# Patient Record
Sex: FEMALE | Race: WHITE | NOT HISPANIC OR LATINO | Employment: OTHER | URBAN - METROPOLITAN AREA
[De-identification: names, ages, dates, MRNs, and addresses within clinical notes are randomized per-mention and may not be internally consistent; named-entity substitution may affect disease eponyms.]

---

## 2017-03-02 RX ORDER — TRAZODONE HYDROCHLORIDE 50 MG/1
TABLET ORAL
Qty: 90 TAB | Refills: 6 | OUTPATIENT
Start: 2017-03-02

## 2017-03-03 NOTE — TELEPHONE ENCOUNTER
Insurance requesting 90 day supply of trazadone and prempro    Was the patient seen in the last year in this department? Yes     Does patient have an active prescription for medications requested? No     Received Request Via: Pharmacy

## 2017-03-31 ENCOUNTER — OFFICE VISIT (OUTPATIENT)
Dept: MEDICAL GROUP | Facility: MEDICAL CENTER | Age: 52
End: 2017-03-31
Attending: FAMILY MEDICINE
Payer: MEDICAID

## 2017-03-31 VITALS
RESPIRATION RATE: 16 BRPM | DIASTOLIC BLOOD PRESSURE: 82 MMHG | HEART RATE: 96 BPM | TEMPERATURE: 98.2 F | OXYGEN SATURATION: 99 % | SYSTOLIC BLOOD PRESSURE: 138 MMHG | HEIGHT: 67 IN

## 2017-03-31 DIAGNOSIS — G25.81 RLS (RESTLESS LEGS SYNDROME): ICD-10-CM

## 2017-03-31 PROCEDURE — 99212 OFFICE O/P EST SF 10 MIN: CPT | Performed by: FAMILY MEDICINE

## 2017-03-31 PROCEDURE — 99213 OFFICE O/P EST LOW 20 MIN: CPT | Performed by: FAMILY MEDICINE

## 2017-03-31 RX ORDER — CLONAZEPAM 1 MG/1
1 TABLET ORAL NIGHTLY PRN
Qty: 30 TAB | Refills: 2 | Status: SHIPPED | OUTPATIENT
Start: 2017-03-31 | End: 2017-06-22 | Stop reason: SDUPTHER
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
## 2017-04-01 NOTE — PROGRESS NOTES
"Subjective:      Astrid Kendall is a 51 y.o. female who presents with Sleep Problem            HPI 1. Restless leg syndrome-patient reports continued good effect taking a single dose of clonazepam 1 mg shortly before bedtime. This allows her to avoid the irritated restless feelings in both legs that otherwise would prolonged sleep latency. She is not having any ankle edema or focal numbness.    ROS negative for altered gait, foot pain, knee pain   Objective:     /82 mmHg  Pulse 96  Temp(Src) 36.8 °C (98.2 °F)  Resp 16  Ht 1.702 m (5' 7.01\")  Wt   SpO2 99%     Physical Exam  Gen.- alert, cooperative, in no acute distress  Neck- midline trachea, thyroid not enlarged or tender,supple, no cervical adenopathy  Chest-clear to auscultation and percussion with normal breath sounds. No retractions. Chest wall nontender  Cardiac- regular rhythm and rate. No murmur, thrill, or heave  Lower extremity-negative for ankle edema, redness, tenderness          Assessment/Plan:     1. RLS (restless legs syndrome)    - clonazepam (KLONOPIN) 1 MG Tab; Take 1 Tab by mouth at bedtime as needed (sleep disturbance).  Dispense: 30 Tab; Refill: 2     Plan: 1. Renew clonazepam 1 mg at at bedtime with 2 refills, #30 each fill  2. Revisit in 3 months  3. UDS reviewed today, 9/29/16-consistent  "

## 2017-04-12 DIAGNOSIS — Z79.890 POSTMENOPAUSAL HRT (HORMONE REPLACEMENT THERAPY): ICD-10-CM

## 2017-04-12 NOTE — TELEPHONE ENCOUNTER
Was the patient seen in the last year in this department? Yes     Does patient have an active prescription for medications requested? No     Received Request Via: Pharmacy. Pt insurance requesting 90 day supply

## 2017-04-13 RX ORDER — TRAZODONE HYDROCHLORIDE 50 MG/1
TABLET ORAL
Qty: 90 TAB | Refills: 6 | Status: SHIPPED | OUTPATIENT
Start: 2017-04-13 | End: 2017-12-27 | Stop reason: SDUPTHER

## 2017-04-17 DIAGNOSIS — Z79.890 POSTMENOPAUSAL HRT (HORMONE REPLACEMENT THERAPY): ICD-10-CM

## 2017-04-18 NOTE — TELEPHONE ENCOUNTER
Pt overdue for PAP (2013), and has not done mammo ordered Dec 2016. Needs well woman visit with me, and complete her mammo next 30d to safely keep using estrogen.

## 2017-04-18 NOTE — TELEPHONE ENCOUNTER
Was the patient seen in the last year in this department? Yes     Does patient have an active prescription for medications requested? Yes     Received Request Via: Pharmacy mus t be 90 day supply

## 2017-05-16 RX ORDER — ESTROGEN,CON/M-PROGEST ACET 0.45-1.5MG
TABLET ORAL
Qty: 28 TAB | Refills: 1 | Status: SHIPPED | OUTPATIENT
Start: 2017-05-16 | End: 2018-06-12

## 2017-06-16 ENCOUNTER — TELEPHONE (OUTPATIENT)
Dept: MEDICAL GROUP | Facility: MEDICAL CENTER | Age: 52
End: 2017-06-16

## 2017-06-17 NOTE — TELEPHONE ENCOUNTER
----- Message from William Pelayo M.D. sent at 6/15/2017  8:59 AM PDT -----  Mammogram results are normal. Please repeat exam in 1 year.

## 2017-06-22 ENCOUNTER — OFFICE VISIT (OUTPATIENT)
Dept: MEDICAL GROUP | Facility: MEDICAL CENTER | Age: 52
End: 2017-06-22
Attending: FAMILY MEDICINE
Payer: MEDICAID

## 2017-06-22 VITALS
HEART RATE: 92 BPM | HEIGHT: 67 IN | WEIGHT: 206 LBS | RESPIRATION RATE: 16 BRPM | SYSTOLIC BLOOD PRESSURE: 126 MMHG | OXYGEN SATURATION: 96 % | DIASTOLIC BLOOD PRESSURE: 78 MMHG | BODY MASS INDEX: 32.33 KG/M2 | TEMPERATURE: 97.9 F

## 2017-06-22 DIAGNOSIS — Z12.11 SCREENING FOR MALIGNANT NEOPLASM OF COLON: ICD-10-CM

## 2017-06-22 DIAGNOSIS — Z78.0 MENOPAUSE: ICD-10-CM

## 2017-06-22 DIAGNOSIS — G25.81 RLS (RESTLESS LEGS SYNDROME): ICD-10-CM

## 2017-06-22 DIAGNOSIS — E66.9 OBESITY (BMI 30-39.9): ICD-10-CM

## 2017-06-22 PROCEDURE — 99212 OFFICE O/P EST SF 10 MIN: CPT | Performed by: FAMILY MEDICINE

## 2017-06-22 PROCEDURE — 99213 OFFICE O/P EST LOW 20 MIN: CPT | Performed by: FAMILY MEDICINE

## 2017-06-22 RX ORDER — CLONAZEPAM 1 MG/1
1 TABLET ORAL NIGHTLY PRN
Qty: 30 TAB | Refills: 2 | Status: SHIPPED | OUTPATIENT
Start: 2017-06-22 | End: 2017-11-07 | Stop reason: SDUPTHER

## 2017-06-22 RX ORDER — PRAMIPEXOLE DIHYDROCHLORIDE 0.5 MG/1
0.5 TABLET ORAL
Qty: 90 TAB | Refills: 3 | Status: SHIPPED | OUTPATIENT
Start: 2017-06-22 | End: 2018-05-04 | Stop reason: SDUPTHER
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
## 2017-06-22 NOTE — PROGRESS NOTES
"Subjective:      Astrid Kednall is a 52 y.o. female who presents with Follow-Up            HPI 1. Menopause-patient reports significant improvement in recent hot flash symptoms since taking Prempro at a reduced dose or 0.4/2.5 daily. Recent mammogram was unremarkable. Review the chart shows that last Pap smear probably was completed with [facility] in 2013 and patient is due.  2. Restless leg syndrome-patient reports that she sleeps better taking the pramipexole at bedtime and then typically waking up in the middle of the night at which time she takes the Klonopin. She reports if she does not take the pramipexole then there is no hope for any significant sleep overnight. Not reporting any leg numbness or tremor.    ROS negative for altered gait. Positive for one single episode of light vaginal spotting less than 2 days, about 2 weeks ago. Negative for dysuria, hematuria, bloody stools       Objective:     /78 mmHg  Pulse 92  Temp(Src) 36.6 °C (97.9 °F)  Resp 16  Ht 1.702 m (5' 7.01\")  Wt 93.441 kg (206 lb)  BMI 32.26 kg/m2  SpO2 96%  Breastfeeding? No     Physical Exam  Gen.- alert, cooperative, in no acute distress  Neck- midline trachea, thyroid not enlarged or tender,supple, no cervical adenopathy  Chest-clear to auscultation and percussion with normal breath sounds. No retractions. Chest wall nontender  Cardiac- regular rhythm and rate. No murmur, thrill, or heave  Lower extremities-intact light touch. Intact strength. Negative for tremor or edema          Assessment/Plan:     1. Menopause      2. RLS (restless legs syndrome)    Plan: 1. Will schedule Pap smear in 3 months at our upcoming next visit  2. Continue Klonopin  3. Again reviewed the risks associated with long-term estrogen/progesterone replacement-will decrease Premarin to 0.3/1.5 once daily  4. FIT packet dispensed today  5. Patient understands to call immediately if she has any further spotting which would require evaluation of " her endometrial lining.

## 2017-11-07 ENCOUNTER — OFFICE VISIT (OUTPATIENT)
Dept: MEDICAL GROUP | Facility: MEDICAL CENTER | Age: 52
End: 2017-11-07
Attending: FAMILY MEDICINE
Payer: MEDICAID

## 2017-11-07 VITALS
OXYGEN SATURATION: 95 % | HEART RATE: 80 BPM | SYSTOLIC BLOOD PRESSURE: 130 MMHG | DIASTOLIC BLOOD PRESSURE: 82 MMHG | RESPIRATION RATE: 16 BRPM | HEIGHT: 67 IN | TEMPERATURE: 98.5 F

## 2017-11-07 DIAGNOSIS — Z23 FLU VACCINE NEED: ICD-10-CM

## 2017-11-07 DIAGNOSIS — L98.9 SKIN LESION OF FACE: ICD-10-CM

## 2017-11-07 DIAGNOSIS — G25.81 RLS (RESTLESS LEGS SYNDROME): ICD-10-CM

## 2017-11-07 PROCEDURE — 99213 OFFICE O/P EST LOW 20 MIN: CPT | Mod: 25 | Performed by: FAMILY MEDICINE

## 2017-11-07 RX ORDER — CLONAZEPAM 1 MG/1
1 TABLET ORAL NIGHTLY PRN
Qty: 30 TAB | Refills: 2 | Status: SHIPPED | OUTPATIENT
Start: 2017-11-07 | End: 2018-02-12 | Stop reason: SDUPTHER

## 2017-11-07 ASSESSMENT — PAIN SCALES - GENERAL: PAINLEVEL: 6=MODERATE PAIN

## 2017-11-07 NOTE — PROGRESS NOTES
"Subjective:      Astrid Kendall is a 52 y.o. female who presents with No chief complaint on file.            HPIHPI:1. Facial skin lesion-patient noticed a slightly roughened area on the lateral right cheek approximately 2 weeks ago. This area has not been tender there's been no bleeding or discharge.  2. Restless leg syndrome-patient reports that she is getting much better control and is able to go to sleep at bedtime without having the severe disturbing sensations in her legs as she rolls her legs into bed, as long as she is taking the pramipexole. She often times will awaken later in the night and is still taking Klonopin 1 mg 1 dose nightly.Not reporting any significant leg swelling.    ROS negative for recent vaginal bleeding, fevers, focal numbness       Objective:     /82   Pulse 80   Temp 36.9 °C (98.5 °F)   Resp 16   Ht 1.702 m (5' 7\")   SpO2 95%      Physical Exam  Gen.-alert cooperative female in no acute distress  Skin-10 x 7 mm slightly roughened tan papule in the right upper lateral cheek area. Sharp margins, suggestive of a seborrheic keratosis  Lower extremities-intact light touch. Intact strength. Negative for ankle edema           Assessment/Plan:     1. Flu vaccine need    - INFLUENZA VACCINE QUAD INJ >3Y(PF)    2. RLS (restless legs syndrome)    - clonazepam (KLONOPIN) 1 MG Tab; Take 1 Tab by mouth at bedtime as needed (sleep disturbance).  Dispense: 30 Tab; Refill: 2    3. Skin lesion of face -Clinically suspect seborrheic keratosis    Plan: 1. Flu vaccine today  2. Patient will consider whether she would like to have a punch biopsy taken of her skin lesion in anticipation of either shave excision or freezing  3. Renew Klonopin today  4. Will schedule well woman visit in 3 months    "

## 2017-12-28 RX ORDER — TRAZODONE HYDROCHLORIDE 50 MG/1
TABLET ORAL
Qty: 30 TAB | Refills: 4 | Status: SHIPPED | OUTPATIENT
Start: 2017-12-28 | End: 2018-05-04 | Stop reason: SDUPTHER

## 2018-02-12 ENCOUNTER — HOSPITAL ENCOUNTER (OUTPATIENT)
Facility: MEDICAL CENTER | Age: 53
End: 2018-02-12
Attending: FAMILY MEDICINE
Payer: MEDICAID

## 2018-02-12 ENCOUNTER — OFFICE VISIT (OUTPATIENT)
Dept: MEDICAL GROUP | Facility: MEDICAL CENTER | Age: 53
End: 2018-02-12
Attending: FAMILY MEDICINE
Payer: MEDICAID

## 2018-02-12 VITALS
HEART RATE: 94 BPM | TEMPERATURE: 97 F | WEIGHT: 213.4 LBS | SYSTOLIC BLOOD PRESSURE: 122 MMHG | RESPIRATION RATE: 16 BRPM | OXYGEN SATURATION: 93 % | HEIGHT: 67 IN | BODY MASS INDEX: 33.49 KG/M2 | DIASTOLIC BLOOD PRESSURE: 82 MMHG

## 2018-02-12 DIAGNOSIS — Z12.4 SCREENING FOR MALIGNANT NEOPLASM OF CERVIX: ICD-10-CM

## 2018-02-12 DIAGNOSIS — Z01.419 WELL WOMAN EXAM: ICD-10-CM

## 2018-02-12 DIAGNOSIS — E66.9 OBESITY (BMI 30-39.9): ICD-10-CM

## 2018-02-12 DIAGNOSIS — Z12.11 SCREENING FOR MALIGNANT NEOPLASM OF COLON: ICD-10-CM

## 2018-02-12 DIAGNOSIS — G25.81 RLS (RESTLESS LEGS SYNDROME): ICD-10-CM

## 2018-02-12 LAB
AMBIGUOUS DTTM AMBI4: NORMAL
CYTOLOGY REG CYTOL: NORMAL

## 2018-02-12 PROCEDURE — 88175 CYTOPATH C/V AUTO FLUID REDO: CPT

## 2018-02-12 PROCEDURE — G0101 CA SCREEN;PELVIC/BREAST EXAM: HCPCS | Performed by: FAMILY MEDICINE

## 2018-02-12 PROCEDURE — 99213 OFFICE O/P EST LOW 20 MIN: CPT | Performed by: FAMILY MEDICINE

## 2018-02-12 RX ORDER — CLONAZEPAM 1 MG/1
1 TABLET ORAL NIGHTLY PRN
Qty: 30 TAB | Refills: 2 | Status: SHIPPED | OUTPATIENT
Start: 2018-02-12 | End: 2018-05-04 | Stop reason: SDUPTHER

## 2018-02-12 ASSESSMENT — PATIENT HEALTH QUESTIONNAIRE - PHQ9: CLINICAL INTERPRETATION OF PHQ2 SCORE: 0

## 2018-02-13 NOTE — PROGRESS NOTES
Chief Complaint   Patient presents with   • Gynecologic Exam       HPI: 1. Cervical cancer screening-patient presents for updated Pap. Last one was possibly done at [place] in the past 2 years. Patient recalls a single abnormal Pap in her early 20s number of studies since then have been normal. Not recalling any vaginal bleeding, pelvic pain  2. Anxiety-patient porches doing well taking half of a 1 mg tablet twice daily, second dose being in the middle of the night when she is often awakened with restless legs syndrome symptoms with quick response to 0.5 mg of clonazepam.   3. Obesity-patient reports she actually has been on a strict diet with modest weight loss in the past 2 weeks. She denies  Hair loss or cold intolerance  Social History     Social History   • Marital status:      Spouse name: N/A   • Number of children: N/A   • Years of education: N/A     Occupational History   • Not on file.     Social History Main Topics   • Smoking status: Never Smoker   • Smokeless tobacco: Never Used   • Alcohol use 0.0 oz/week      Comment: occas   • Drug use: No   • Sexual activity: Not on file      Comment: single, [employer] systems     Other Topics Concern   • Not on file     Social History Narrative   • No narrative on file       No results for input(s): HEMOGLOBIN, HEMATOCRIT, PLATELETCT, SODIUM, POTASSIUM, CHLORIDE, GLUCOSE, BUN, CREATININE in the last 72 hours.    ROS:GEN-no fever, weight loss SKIN-no rash, pruritus HENT-no ear pain, sore throat EYE-no double vision, eye discharge CV-negative for orthopnea, chest pain RESP-negative for cough, wheezing GI-negative for melena, constipation, nausea -negative for dysuria, hematuria NEURO-negative for dizziness, tremor END/HEM-negative for bruisability, bleeding, polydipsia    Current Outpatient Prescriptions on File Prior to Visit   Medication Sig Dispense Refill   • trazodone (DESYREL) 50 MG Tab TAKE ONE TABLET BY MOUTH ONE TIME DAILY IN THE P.M. 30 Tab 4  "  • estrogen, conjugated,-medroxyprogesterone (PREMPRO) 0.3-1.5 MG per tablet Take 1 Tab by mouth every day. 30 Tab 3   • pramipexole (MIRAPEX) 0.5 MG Tab Take 1 Tab by mouth every day. 90 Tab 3   • PREMPRO 0.45-1.5 MG per tablet TAKE ONE TABLET BY MOUTH ONE TIME DAILY 28 Tab 1   • cyclobenzaprine (FLEXERIL) 5 MG tablet TAKE 1-2 TABLETS ORALLY 3 TIMES DAILY AS NEEDED FOR MILD PAIN OR MUSCLE SPASMS - GENERIC FOR: FLEXERIL 90 Tab 0   • ibuprofen (MOTRIN) 600 MG Tab Take 1 Tab by mouth every 6 hours as needed for Mild Pain. 90 Tab 3   • meloxicam (MOBIC) 7.5 MG Tab Take 1 Tab by mouth every day. 30 Tab 3     No current facility-administered medications on file prior to visit.          Physical Exam:Blood pressure 122/82, pulse 94, temperature 36.1 °C (97 °F), resp. rate 16, height 1.702 m (5' 7\"), weight 96.8 kg (213 lb 6.4 oz), SpO2 93 %.  \"Gen.- alert, cooperative, in no acute distress  Neck- midline trachea, thyroid not enlarged or tender,supple, no cervical adenopathy  Chest-clear to auscultation and percussion with normal breath sounds. No retractions. Chest wall nontender  Cardiac- regular rhythm and rate. No murmur, thrill, or heave  Abdomen-Abdomen is soft, nontender, normal bowel sounds, no masses, guarding, or organomegaly.  Skin-Skin is clear without rash, redness, or cyanosis.  HEENT-Skull is A/N. TMs and canals are clear. Oropharynx shows intact lips and dentition, tongue is midline, mucosa is pink and moist.   Eyes- show flat discs and normal vessels, clear conjunctiva and sclera, EOMI, PERRL. Lids and and lashes are clear  -normal female external genitalia. Vagina shows pink moist mucosa without purulent discharge. Cervix is nontender. Adnexa are nontender and not enlarged on palpation. Uterus is midline and nontender by palpation.  Neuro- intact light touch. Intact strength bilaterally. Normal gait. No tremor. Normal speech     Assessment:  1. RLS (restless legs syndrome)  clonazePAM (KLONOPIN) 1 MG " Tab   2. Obesity (BMI 30-39.9)  Patient identified as having weight management issue.  Appropriate orders and counseling given.   3. Well woman exam     4. Screening for malignant neoplasm of cervix  PAP IG, RFX HPV ASCU   5. Screening for malignant neoplasm of colon  OCCULT BLOOD FECES IMMUNOASSAY       Plan: 1. Renew clonazepam, 2 refills  2. Revisit in 3 months  3. Continue current efforts to limit calories and pursue gradual weight loss  4. Check on pending Pap  5. FIT packet provided

## 2018-02-15 ENCOUNTER — TELEPHONE (OUTPATIENT)
Dept: MEDICAL GROUP | Facility: MEDICAL CENTER | Age: 53
End: 2018-02-15

## 2018-02-15 NOTE — TELEPHONE ENCOUNTER
----- Message from William Pelayo M.D. sent at 2/15/2018  7:39 AM PST -----  Pap smear is negative for cancer cells

## 2018-03-05 ENCOUNTER — OFFICE VISIT (OUTPATIENT)
Dept: MEDICAL GROUP | Facility: MEDICAL CENTER | Age: 53
End: 2018-03-05
Attending: FAMILY MEDICINE
Payer: MEDICAID

## 2018-03-05 VITALS
SYSTOLIC BLOOD PRESSURE: 124 MMHG | DIASTOLIC BLOOD PRESSURE: 80 MMHG | BODY MASS INDEX: 33.43 KG/M2 | HEIGHT: 67 IN | OXYGEN SATURATION: 95 % | RESPIRATION RATE: 16 BRPM | HEART RATE: 100 BPM | WEIGHT: 213 LBS | TEMPERATURE: 98.4 F

## 2018-03-05 DIAGNOSIS — J01.00 ACUTE MAXILLARY SINUSITIS, RECURRENCE NOT SPECIFIED: ICD-10-CM

## 2018-03-05 DIAGNOSIS — R05.9 COUGH: ICD-10-CM

## 2018-03-05 PROCEDURE — 99212 OFFICE O/P EST SF 10 MIN: CPT | Performed by: FAMILY MEDICINE

## 2018-03-05 PROCEDURE — 99213 OFFICE O/P EST LOW 20 MIN: CPT | Performed by: FAMILY MEDICINE

## 2018-03-05 RX ORDER — AZITHROMYCIN 250 MG/1
TABLET, FILM COATED ORAL
Qty: 6 TAB | Refills: 1 | Status: SHIPPED | OUTPATIENT
Start: 2018-03-05 | End: 2018-06-12

## 2018-03-05 RX ORDER — BENZONATATE 200 MG/1
200 CAPSULE ORAL 3 TIMES DAILY PRN
Qty: 30 CAP | Refills: 0 | Status: SHIPPED | OUTPATIENT
Start: 2018-03-05 | End: 2018-03-25

## 2018-03-05 ASSESSMENT — PAIN SCALES - GENERAL: PAINLEVEL: NO PAIN

## 2018-03-05 NOTE — PROGRESS NOTES
"Subjective:      Astrid Kendall is a 52 y.o. female who presents with Sinusitis and Cough            HPI 1. Cough/sinusitis-patient reports that she's had her current persistent cough over the past 6 weeks although it has worsened in the past 10 days. She does report that yesterday was improved but then the cough seems to have relapsed today. It is productive of some mucus. She reports the recurrent coughing episodes tend to happen at night after she is laying down. 6 weeks ago she had onset of chills lasting 1-2 days before subsiding. She does not smoke    ROS notable for early or hemoptysis 1-2 months ago, no recent fever or recent emesis in the past 1 month       Objective:     /80   Pulse 100   Temp 36.9 °C (98.4 °F)   Resp 16   Ht 1.702 m (5' 7.01\")   Wt 96.6 kg (213 lb)   SpO2 95%   BMI 33.35 kg/m²      Physical Exam  General- alert,cooperative patient in no acute distress  Ears- normal tms without redness, perforation. Canals unremarkable  Nares- clear, pink, moist mucosa without bleeding. No purulent nasal DC  Orophx.- lips normal. Clear, pink, moist mucosa without redness or exudate. Tongue is midline  Chest-Normal to auscultation and percussion, movement is symmetric. Nontender to palpation.  Sinuses-1+ tender over the maxillary areas bilaterally, frontal areas nontender            Assessment/Plan:     1. Acute maxillary sinusitis, recurrence not specified      2. Cough    Plan: 1. Zithromax Z-JANI, 1 refill  2. Tessalon Perles 200 mg up to 3 times a day as needed for cough  3. Follow-up as needed      "

## 2018-05-04 DIAGNOSIS — G25.81 RLS (RESTLESS LEGS SYNDROME): ICD-10-CM

## 2018-05-04 RX ORDER — PRAMIPEXOLE DIHYDROCHLORIDE 0.5 MG/1
0.5 TABLET ORAL
Qty: 90 TAB | Refills: 3 | Status: SHIPPED | OUTPATIENT
Start: 2018-05-04 | End: 2018-06-12 | Stop reason: SDUPTHER

## 2018-05-04 RX ORDER — CLONAZEPAM 1 MG/1
1 TABLET ORAL NIGHTLY PRN
Qty: 30 TAB | Refills: 2 | Status: SHIPPED | OUTPATIENT
Start: 2018-05-04 | End: 2018-06-03

## 2018-05-04 RX ORDER — TRAZODONE HYDROCHLORIDE 50 MG/1
TABLET ORAL
Qty: 30 TAB | Refills: 4 | Status: SHIPPED | OUTPATIENT
Start: 2018-05-04 | End: 2018-06-12 | Stop reason: SDUPTHER

## 2018-06-12 ENCOUNTER — OFFICE VISIT (OUTPATIENT)
Dept: MEDICAL GROUP | Facility: PHYSICIAN GROUP | Age: 53
End: 2018-06-12
Payer: COMMERCIAL

## 2018-06-12 VITALS
SYSTOLIC BLOOD PRESSURE: 132 MMHG | OXYGEN SATURATION: 94 % | DIASTOLIC BLOOD PRESSURE: 90 MMHG | RESPIRATION RATE: 18 BRPM | TEMPERATURE: 98.9 F | BODY MASS INDEX: 32.69 KG/M2 | WEIGHT: 208.3 LBS | HEART RATE: 85 BPM | HEIGHT: 67 IN

## 2018-06-12 DIAGNOSIS — F51.01 PRIMARY INSOMNIA: ICD-10-CM

## 2018-06-12 DIAGNOSIS — Z12.11 SCREENING FOR COLON CANCER: ICD-10-CM

## 2018-06-12 DIAGNOSIS — G25.81 RLS (RESTLESS LEGS SYNDROME): ICD-10-CM

## 2018-06-12 DIAGNOSIS — J31.0 CHRONIC RHINITIS: ICD-10-CM

## 2018-06-12 PROCEDURE — 99213 OFFICE O/P EST LOW 20 MIN: CPT | Performed by: FAMILY MEDICINE

## 2018-06-12 RX ORDER — CLONAZEPAM 1 MG/1
1 TABLET ORAL NIGHTLY PRN
COMMUNITY
End: 2018-06-20 | Stop reason: SDUPTHER

## 2018-06-12 RX ORDER — FLUTICASONE PROPIONATE 50 MCG
1 SPRAY, SUSPENSION (ML) NASAL DAILY
COMMUNITY
End: 2018-06-12 | Stop reason: SDUPTHER

## 2018-06-12 RX ORDER — PRAMIPEXOLE DIHYDROCHLORIDE 0.5 MG/1
0.5 TABLET ORAL
Qty: 90 TAB | Refills: 1 | Status: SHIPPED | OUTPATIENT
Start: 2018-06-12 | End: 2018-11-26 | Stop reason: SDUPTHER

## 2018-06-12 RX ORDER — FLUTICASONE PROPIONATE 50 MCG
1 SPRAY, SUSPENSION (ML) NASAL DAILY
Qty: 16 G | Refills: 5 | Status: SHIPPED | OUTPATIENT
Start: 2018-06-12 | End: 2018-11-26 | Stop reason: SDUPTHER

## 2018-06-12 RX ORDER — TRAZODONE HYDROCHLORIDE 50 MG/1
TABLET ORAL
Qty: 30 TAB | Refills: 5 | Status: SHIPPED | OUTPATIENT
Start: 2018-06-12 | End: 2018-11-26 | Stop reason: SDUPTHER
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
## 2018-06-12 NOTE — ASSESSMENT & PLAN NOTE
Has been taking pramipexole nightly for years, with occ. Clonazepam if not sufficient relief. Needs refill pramipexole today, has enough clonazepam for the moment.

## 2018-06-20 ENCOUNTER — TELEPHONE (OUTPATIENT)
Dept: MEDICAL GROUP | Facility: PHYSICIAN GROUP | Age: 53
End: 2018-06-20

## 2018-06-20 DIAGNOSIS — G25.81 RESTLESS LEGS SYNDROME (RLS): ICD-10-CM

## 2018-06-20 RX ORDER — CLONAZEPAM 1 MG/1
1 TABLET ORAL NIGHTLY PRN
Qty: 30 TAB | Refills: 0 | Status: SHIPPED | OUTPATIENT
Start: 2018-06-20 | End: 2018-07-17 | Stop reason: SDUPTHER

## 2018-06-20 NOTE — TELEPHONE ENCOUNTER
Called patient back and let her know that her refill for Clonazepam was approved and sent to pharmacy.

## 2018-06-20 NOTE — TELEPHONE ENCOUNTER
Pt called and stated this medication wasn't refilled at the time of the new patient appt. She is requesting this get refilled.

## 2018-06-21 NOTE — TELEPHONE ENCOUNTER
This was refilled by the provider in [place]. The office in [place] faxed the medication to the pharmacy, in the [place] office we received an notice from the pharmacy that it was faxed incorrectly. I spoke to Angela and advised her of this.    The pt called today and stated her pharmacy never received this. I spoke to Fay MONROE, and they couldn't find the script so I called the pharmacy and called in this medication. Pt was notified by Subhash.

## 2018-07-17 DIAGNOSIS — G25.81 RLS (RESTLESS LEGS SYNDROME): ICD-10-CM

## 2018-07-17 DIAGNOSIS — G25.81 RESTLESS LEGS SYNDROME (RLS): ICD-10-CM

## 2018-07-17 RX ORDER — CLONAZEPAM 1 MG/1
1 TABLET ORAL NIGHTLY PRN
Qty: 30 TAB | Refills: 0 | Status: SHIPPED | OUTPATIENT
Start: 2018-09-19 | End: 2018-09-21 | Stop reason: SDUPTHER

## 2018-07-17 RX ORDER — CLONAZEPAM 1 MG/1
1 TABLET ORAL NIGHTLY PRN
Qty: 30 TAB | Refills: 0 | Status: SHIPPED | OUTPATIENT
Start: 2018-08-19 | End: 2018-07-17 | Stop reason: SDUPTHER

## 2018-09-20 DIAGNOSIS — G25.81 RESTLESS LEGS SYNDROME (RLS): ICD-10-CM

## 2018-09-20 RX ORDER — CLONAZEPAM 1 MG/1
1 TABLET ORAL NIGHTLY PRN
Qty: 30 TAB | Refills: 0 | OUTPATIENT
Start: 2018-09-20 | End: 2018-10-20

## 2018-09-20 NOTE — TELEPHONE ENCOUNTER
Patient has no pills left and she really needs this medication. She is asking for a small refill to last her until her appointment on 9/25/18?

## 2018-09-21 DIAGNOSIS — G25.81 RESTLESS LEGS SYNDROME (RLS): ICD-10-CM

## 2018-09-21 RX ORDER — CLONAZEPAM 1 MG/1
1 TABLET ORAL NIGHTLY PRN
Qty: 10 TAB | Refills: 0 | Status: SHIPPED | OUTPATIENT
Start: 2018-09-21 | End: 2018-09-25

## 2018-09-25 ENCOUNTER — OFFICE VISIT (OUTPATIENT)
Dept: MEDICAL GROUP | Facility: PHYSICIAN GROUP | Age: 53
End: 2018-09-25
Payer: COMMERCIAL

## 2018-09-25 VITALS
WEIGHT: 208 LBS | SYSTOLIC BLOOD PRESSURE: 130 MMHG | HEIGHT: 67 IN | HEART RATE: 79 BPM | RESPIRATION RATE: 14 BRPM | DIASTOLIC BLOOD PRESSURE: 82 MMHG | BODY MASS INDEX: 32.65 KG/M2 | OXYGEN SATURATION: 97 % | TEMPERATURE: 97.9 F

## 2018-09-25 DIAGNOSIS — Z23 NEED FOR VACCINATION: ICD-10-CM

## 2018-09-25 DIAGNOSIS — G25.81 RESTLESS LEGS SYNDROME (RLS): ICD-10-CM

## 2018-09-25 PROCEDURE — 90686 IIV4 VACC NO PRSV 0.5 ML IM: CPT | Performed by: FAMILY MEDICINE

## 2018-09-25 PROCEDURE — 90471 IMMUNIZATION ADMIN: CPT | Performed by: FAMILY MEDICINE

## 2018-09-25 PROCEDURE — 99212 OFFICE O/P EST SF 10 MIN: CPT | Mod: 25 | Performed by: FAMILY MEDICINE

## 2018-09-25 RX ORDER — CLONAZEPAM 1 MG/1
1 TABLET ORAL NIGHTLY PRN
Qty: 30 TAB | Refills: 0 | Status: SHIPPED | OUTPATIENT
Start: 2018-10-20 | End: 2018-09-25 | Stop reason: SDUPTHER

## 2018-09-25 RX ORDER — CLONAZEPAM 1 MG/1
1 TABLET ORAL NIGHTLY PRN
Qty: 30 TAB | Refills: 0 | Status: SHIPPED | OUTPATIENT
Start: 2018-11-20 | End: 2018-12-20
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
## 2018-09-26 NOTE — ASSESSMENT & PLAN NOTE
Doing well on current dose of Klonopin. Apparently pharmacy filled latest Rx for #30 instead of the #10 ordered.

## 2018-11-26 DIAGNOSIS — J31.0 CHRONIC RHINITIS: ICD-10-CM

## 2018-11-26 DIAGNOSIS — G25.81 RLS (RESTLESS LEGS SYNDROME): ICD-10-CM

## 2018-11-26 DIAGNOSIS — F51.01 PRIMARY INSOMNIA: ICD-10-CM

## 2018-11-26 DIAGNOSIS — M62.838 MUSCLE SPASM: ICD-10-CM

## 2018-11-26 RX ORDER — IBUPROFEN 600 MG/1
TABLET ORAL
Qty: 90 TAB | Refills: 0 | Status: SHIPPED | OUTPATIENT
Start: 2018-11-26 | End: 2019-12-10 | Stop reason: SDUPTHER

## 2018-11-26 RX ORDER — PRAMIPEXOLE DIHYDROCHLORIDE 0.5 MG/1
0.5 TABLET ORAL
Qty: 90 TAB | Refills: 1 | Status: SHIPPED | OUTPATIENT
Start: 2018-11-26 | End: 2019-01-09 | Stop reason: SDUPTHER

## 2018-11-26 RX ORDER — CYCLOBENZAPRINE HCL 5 MG
TABLET ORAL
Qty: 90 TAB | Refills: 0 | Status: SHIPPED | OUTPATIENT
Start: 2018-11-26

## 2018-11-26 RX ORDER — TRAZODONE HYDROCHLORIDE 50 MG/1
TABLET ORAL
Qty: 30 TAB | Refills: 5 | Status: SHIPPED | OUTPATIENT
Start: 2018-11-26 | End: 2018-12-08 | Stop reason: SDUPTHER

## 2018-11-26 RX ORDER — FLUTICASONE PROPIONATE 50 MCG
1 SPRAY, SUSPENSION (ML) NASAL DAILY
Qty: 16 G | Refills: 5 | Status: SHIPPED | OUTPATIENT
Start: 2018-11-26 | End: 2019-04-02 | Stop reason: SDUPTHER

## 2018-11-26 NOTE — TELEPHONE ENCOUNTER
Patient called stating she has been trying to get all of her medications sent to [facility] [id number] in [place] and they haven't received anything yet.     Was the patient seen in the last year in this department? Yes    Does patient have an active prescription for medications requested? Yes    Received Request Via: Patient    Last Visit: 9/25/18  Last Lab: 12/19/15

## 2019-01-03 ENCOUNTER — OFFICE VISIT (OUTPATIENT)
Dept: MEDICAL GROUP | Facility: PHYSICIAN GROUP | Age: 54
End: 2019-01-03
Payer: COMMERCIAL

## 2019-01-03 VITALS
HEIGHT: 67 IN | HEART RATE: 92 BPM | BODY MASS INDEX: 29.03 KG/M2 | RESPIRATION RATE: 16 BRPM | WEIGHT: 185 LBS | DIASTOLIC BLOOD PRESSURE: 96 MMHG | TEMPERATURE: 98.4 F | SYSTOLIC BLOOD PRESSURE: 148 MMHG | OXYGEN SATURATION: 95 %

## 2019-01-03 DIAGNOSIS — Z12.11 SCREENING FOR COLON CANCER: ICD-10-CM

## 2019-01-03 DIAGNOSIS — G25.81 RLS (RESTLESS LEGS SYNDROME): ICD-10-CM

## 2019-01-03 PROCEDURE — 99212 OFFICE O/P EST SF 10 MIN: CPT | Performed by: FAMILY MEDICINE

## 2019-01-03 RX ORDER — CLONAZEPAM 1 MG/1
1 TABLET ORAL
Qty: 30 TAB | Refills: 0 | Status: SHIPPED | OUTPATIENT
Start: 2019-03-03 | End: 2019-01-03 | Stop reason: SDUPTHER

## 2019-01-03 RX ORDER — CLONAZEPAM 1 MG/1
1 TABLET ORAL
Qty: 30 TAB | Refills: 0 | Status: SHIPPED | OUTPATIENT
Start: 2019-01-03 | End: 2019-01-03 | Stop reason: SDUPTHER

## 2019-01-03 RX ORDER — CLONAZEPAM 1 MG/1
1 TABLET ORAL
Qty: 30 TAB | Refills: 0 | Status: SHIPPED | OUTPATIENT
Start: 2019-02-03 | End: 2019-01-03 | Stop reason: SDUPTHER

## 2019-01-03 RX ORDER — CLONAZEPAM 1 MG/1
1 TABLET ORAL
Qty: 30 TAB | Refills: 0 | Status: SHIPPED | OUTPATIENT
Start: 2019-01-03 | End: 2019-02-03

## 2019-01-03 ASSESSMENT — PATIENT HEALTH QUESTIONNAIRE - PHQ9: CLINICAL INTERPRETATION OF PHQ2 SCORE: 0
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
## 2019-02-05 ENCOUNTER — TELEPHONE (OUTPATIENT)
Dept: MEDICAL GROUP | Facility: PHYSICIAN GROUP | Age: 54
End: 2019-02-05

## 2019-02-05 NOTE — TELEPHONE ENCOUNTER
Patient called and left 2 vm on 02/04/19. This was not f.v up.    The notes stated something regarding her Clonazepam, unsure of message. Called pt, left vm and advised to call back regarding this.

## 2019-02-05 NOTE — TELEPHONE ENCOUNTER
Phone Number Called: Astrid Kendall      Message: patient was called back and another encounter was open regarding this.     Left Message for patient to call back: N\A

## 2019-02-05 NOTE — TELEPHONE ENCOUNTER
Patient called and stated that she was having a hard time getting her clonazepam refilled at her pharmacy at [place]. She stated that that was the only pharmacy that would refill her script until she recently found one her in [place]. She was asking if we could cancel the ones at [place] and call it in at the [facility] pharmacy. Since its a controlled substance medication we could not cancle and re order without a hard paper script. I spoke to  and he stated that he anastacio have to see her for us to refill these meds and giver her a paper scrit or she can wait until dr davis is back on 2/12. Patient stated that this is fucking bullshit and ridiculous and this has been an issue ever since she started seeing the provider.  I said I was sorry and relayed the options again. Patients didn't want to come in or wait until 2/12 and stated that she would just go up to [place] and drive 4 hours in the snow to get a refill.
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
## 2019-04-02 ENCOUNTER — OFFICE VISIT (OUTPATIENT)
Dept: MEDICAL GROUP | Facility: PHYSICIAN GROUP | Age: 54
End: 2019-04-02
Payer: COMMERCIAL

## 2019-04-02 VITALS
HEIGHT: 67 IN | DIASTOLIC BLOOD PRESSURE: 70 MMHG | HEART RATE: 98 BPM | TEMPERATURE: 98.1 F | OXYGEN SATURATION: 95 % | SYSTOLIC BLOOD PRESSURE: 150 MMHG | BODY MASS INDEX: 28.98 KG/M2 | RESPIRATION RATE: 12 BRPM

## 2019-04-02 DIAGNOSIS — J31.0 CHRONIC RHINITIS: ICD-10-CM

## 2019-04-02 DIAGNOSIS — F51.01 PRIMARY INSOMNIA: ICD-10-CM

## 2019-04-02 DIAGNOSIS — Z79.899 BENZODIAZEPINE USE AGREEMENT EXISTS: ICD-10-CM

## 2019-04-02 DIAGNOSIS — G25.81 RLS (RESTLESS LEGS SYNDROME): ICD-10-CM

## 2019-04-02 PROCEDURE — 99214 OFFICE O/P EST MOD 30 MIN: CPT | Performed by: FAMILY MEDICINE

## 2019-04-02 RX ORDER — FLUTICASONE PROPIONATE 50 MCG
1 SPRAY, SUSPENSION (ML) NASAL DAILY
Qty: 16 G | Refills: 5 | Status: SHIPPED | OUTPATIENT
Start: 2019-04-02

## 2019-04-02 RX ORDER — CLONAZEPAM 1 MG/1
0.5 TABLET ORAL 2 TIMES DAILY
COMMUNITY
End: 2019-04-02 | Stop reason: SDUPTHER

## 2019-04-02 RX ORDER — TRAZODONE HYDROCHLORIDE 50 MG/1
50 TABLET ORAL
Qty: 90 TAB | Refills: 1 | Status: SHIPPED | OUTPATIENT
Start: 2019-04-02 | End: 2019-10-30 | Stop reason: SDUPTHER

## 2019-04-02 RX ORDER — PRAMIPEXOLE DIHYDROCHLORIDE 0.5 MG/1
0.5 TABLET ORAL
Qty: 90 TAB | Refills: 1 | Status: SHIPPED | OUTPATIENT
Start: 2019-04-02 | End: 2019-10-30 | Stop reason: SDUPTHER

## 2019-04-02 RX ORDER — CLONAZEPAM 1 MG/1
1 TABLET ORAL
Qty: 90 TAB | Refills: 0 | Status: SHIPPED | OUTPATIENT
Start: 2019-04-02 | End: 2019-07-01
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
## 2019-08-08 ENCOUNTER — OFFICE VISIT (OUTPATIENT)
Dept: MEDICAL GROUP | Facility: PHYSICIAN GROUP | Age: 54
End: 2019-08-08
Payer: COMMERCIAL

## 2019-08-08 VITALS
SYSTOLIC BLOOD PRESSURE: 146 MMHG | OXYGEN SATURATION: 99 % | DIASTOLIC BLOOD PRESSURE: 82 MMHG | TEMPERATURE: 98.6 F | RESPIRATION RATE: 20 BRPM | HEART RATE: 90 BPM

## 2019-08-08 DIAGNOSIS — F51.01 PRIMARY INSOMNIA: ICD-10-CM

## 2019-08-08 PROCEDURE — 99212 OFFICE O/P EST SF 10 MIN: CPT | Performed by: FAMILY MEDICINE

## 2019-08-08 RX ORDER — CLONAZEPAM 1 MG/1
1 TABLET ORAL NIGHTLY PRN
Qty: 30 TAB | Refills: 2 | Status: SHIPPED | OUTPATIENT
Start: 2019-08-08 | End: 2019-12-12 | Stop reason: SDUPTHER
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
## 2019-11-14 ENCOUNTER — OFFICE VISIT (OUTPATIENT)
Dept: MEDICAL GROUP | Facility: PHYSICIAN GROUP | Age: 54
End: 2019-11-14
Payer: COMMERCIAL

## 2019-11-14 VITALS
SYSTOLIC BLOOD PRESSURE: 130 MMHG | TEMPERATURE: 98 F | OXYGEN SATURATION: 97 % | DIASTOLIC BLOOD PRESSURE: 96 MMHG | RESPIRATION RATE: 16 BRPM | HEART RATE: 106 BPM

## 2019-11-14 DIAGNOSIS — K04.7 DENTAL ABSCESS: ICD-10-CM

## 2019-11-14 PROBLEM — K08.89 TOOTHACHE: Status: ACTIVE | Noted: 2019-11-14

## 2019-11-14 PROCEDURE — 99213 OFFICE O/P EST LOW 20 MIN: CPT | Performed by: FAMILY MEDICINE

## 2019-11-14 RX ORDER — AMOXICILLIN 500 MG/1
500 CAPSULE ORAL 3 TIMES DAILY
Qty: 30 CAP | Refills: 0 | Status: SHIPPED | OUTPATIENT
Start: 2019-11-14
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
## 2019-11-14 NOTE — ASSESSMENT & PLAN NOTE
Has been experiencing pain in right upper jaw, fever, chills, malaise for past couple days. Also noted swelling in gum above upper premolar, which has since popped spontaneously.

## 2019-12-10 DIAGNOSIS — M62.838 MUSCLE SPASM: ICD-10-CM

## 2019-12-10 RX ORDER — IBUPROFEN 600 MG/1
TABLET ORAL
Qty: 90 TAB | Refills: 0 | Status: SHIPPED | OUTPATIENT
Start: 2019-12-10

## 2019-12-12 ENCOUNTER — TELEPHONE (OUTPATIENT)
Dept: MEDICAL GROUP | Facility: PHYSICIAN GROUP | Age: 54
End: 2019-12-12

## 2019-12-12 DIAGNOSIS — F51.01 PRIMARY INSOMNIA: ICD-10-CM

## 2019-12-12 RX ORDER — CLONAZEPAM 1 MG/1
1 TABLET ORAL NIGHTLY PRN
Qty: 30 TAB | Refills: 2 | Status: SHIPPED | OUTPATIENT
Start: 2019-12-12 | End: 2020-03-11

## 2019-12-13 NOTE — TELEPHONE ENCOUNTER
Returned call regarding rx problem. Patient upset that her RX was denied by the pharmacy, Pt states she has had nothing but problems since coming to this office. I apologized for patients bad experiences spoke with PCP had RX in question sent to the pharmacy. Called patient back and again apologized, patient thanked me and stated she would be finding a new PCP as she doesn't want to deal with this office anymore.

## 2019-12-16 ENCOUNTER — TELEPHONE (OUTPATIENT)
Dept: MEDICAL GROUP | Facility: PHYSICIAN GROUP | Age: 54
End: 2019-12-16

## 2019-12-16 NOTE — TELEPHONE ENCOUNTER
Called pt to let her know that a 30 Rx was available at the pharmacy to . Pt was sander lala and proceeded to yell over me and would not let me speak. I listened, apologized and let the patient know again that the rx was ready for her to . Pt continued to yell about how stupid and incompetent Dr. Hendrickson was and that we should sent extra pills to the pharmacy for her. I let the patient know we would not be able to do this but the Rx was ready for her to  now. Pt then asked for my managers contact information. I gave the patient Karrie Everett name and [phone].

## 2019-12-16 NOTE — TELEPHONE ENCOUNTER
Dr. VERA,  This rx was written for quantity 30 up to 90 days, pharmacy has changed this to 30 for 30. Pt called upset that RX was not ready for her to  FYREFUGIO

## 2021-03-16 ENCOUNTER — HOSPITAL ENCOUNTER (OUTPATIENT)
Dept: RADIOLOGY | Facility: MEDICAL CENTER | Age: 56
End: 2021-03-16
Attending: NURSE PRACTITIONER
Payer: COMMERCIAL

## 2021-03-16 DIAGNOSIS — Z12.31 ENCOUNTER FOR MAMMOGRAM TO ESTABLISH BASELINE MAMMOGRAM: ICD-10-CM

## 2021-03-16 PROCEDURE — 77063 BREAST TOMOSYNTHESIS BI: CPT
